# Patient Record
Sex: MALE | HISPANIC OR LATINO | URBAN - METROPOLITAN AREA
[De-identification: names, ages, dates, MRNs, and addresses within clinical notes are randomized per-mention and may not be internally consistent; named-entity substitution may affect disease eponyms.]

---

## 2018-10-18 NOTE — IMPRESSION/PLAN
Impression: Primary open-angle glaucoma, bilateral, indeterminate stage: H40.1134. /528
10/18 OCT 82/91 6/5 Plan: Discussed glaucoma and risk of vision loss without treatment and close follow-up. Discussed risks/benefits/alternatives to treatment. IOP too high for optic nerve appearance. OCT RNFL ordered and reviewed with pt. Patient has been using Alphagan P QD OU. Rec patient increase  Alphagan P BID OU will recheck IOP in 1 month DE, HVF and photos. May need additional gtts at next visit.

## 2018-10-18 NOTE — IMPRESSION/PLAN
Impression: Type 2 diab w mild nonprlf diabetic rtnop w/o macular edema, bilateral: E06.8087. Plan: OU: Discussed diagnosis in detail with patient. Advised patient of condition. Reassured patient of current condition and treatment. Emphasized blood sugar control. MAC OCT at next visit with DE. Will rec Retina consult if needed at next visit.

## 2018-12-27 NOTE — IMPRESSION/PLAN
Impression: Primary open-angle glaucoma, left eye, severe stage: O20.5049. Goal low teens /528 
10/18 OCT 82/91 6/5 12/18 HVF OD INS OS IA/SA Plan: Discussed glaucoma and risk of vision loss without treatment and close follow-up. Discussed risks/benefits/alternatives to treatment. HVF reviewed with patient.  Recommend patient continue Alphagan BID OU. 4 month IOP check with Dr Konstantin Hull

## 2018-12-28 NOTE — IMPRESSION/PLAN
Impression: Type 2 diab w moderate nonprlf diab rtnop w macular edema, bilateral: S07.8830.
12/18 MAC OCT ME OU Plan: Discussed diagnosis in detail with patient. Advised patient of condition. Emphasized blood sugar control. Consult recommended [[facility]], Dr Wil Mcneal, possible treatment.

## 2019-01-03 NOTE — IMPRESSION/PLAN
Impression: Type 2 diab w moderate nonprlf diab rtnop w macular edema, bilateral: M93.1036. Plan: OD: extra foveal thickening; observe for now. OS: mild IRF; Rec treatment with avastin but pt prefers to observe for now due to leaving town- will return in March. Will defer tx for now; discussed to anticipate tx next visit if unchanged or progression. Pt understood. Discussed importance of glycemic, bp, lipid control. 
F/u 2 mths DFE/OCT/poss avastin

## 2023-06-01 ENCOUNTER — OFFICE VISIT (OUTPATIENT)
Dept: URBAN - METROPOLITAN AREA CLINIC 45 | Facility: CLINIC | Age: 73
End: 2023-06-01
Payer: MEDICAID

## 2023-06-01 DIAGNOSIS — H40.1112 PRIMARY OPEN-ANGLE GLAUCOMA, RIGHT EYE, MODERATE STAGE: Primary | ICD-10-CM

## 2023-06-01 DIAGNOSIS — H40.1123 PRIMARY OPEN-ANGLE GLAUCOMA, LEFT EYE, SEVERE STAGE: ICD-10-CM

## 2023-06-01 DIAGNOSIS — E11.3313 TYPE 2 DIAB W MODERATE NONPRLF DIAB RTNOP W MACULAR EDEMA, BILATERAL: ICD-10-CM

## 2023-06-01 PROCEDURE — 99204 OFFICE O/P NEW MOD 45 MIN: CPT | Performed by: OPTOMETRIST

## 2023-06-01 PROCEDURE — 76514 ECHO EXAM OF EYE THICKNESS: CPT | Performed by: OPTOMETRIST

## 2023-06-01 PROCEDURE — 92134 CPTRZ OPH DX IMG PST SGM RTA: CPT | Performed by: OPTOMETRIST

## 2023-06-01 PROCEDURE — 92133 CPTRZD OPH DX IMG PST SGM ON: CPT | Performed by: OPTOMETRIST

## 2023-06-01 PROCEDURE — 92250 FUNDUS PHOTOGRAPHY W/I&R: CPT | Performed by: OPTOMETRIST

## 2023-06-01 RX ORDER — LATANOPROST 50 UG/ML
0.005 % SOLUTION OPHTHALMIC
Qty: 2.5 | Refills: 6 | Status: ACTIVE
Start: 2023-06-01

## 2023-06-01 RX ORDER — LATANOPROST 50 UG/ML
0.005 % SOLUTION OPHTHALMIC
Qty: 2.5 | Refills: 6 | Status: INACTIVE
Start: 2023-06-01 | End: 2023-06-01

## 2023-06-01 ASSESSMENT — INTRAOCULAR PRESSURE
OD: 15
OS: 15

## 2023-06-01 NOTE — IMPRESSION/PLAN
Impression: Primary open-angle glaucoma, left eye, severe stage: Q20.1556. Plan: Mely (Peruvian speaking tech) explained to patient in [place]uvian importance of using drops and risk of vision loss with poor compliance. Discussed drops vs surgery.  Start Latanoprost q hs ou, consult with Dr. Whitlock Nicely regarding surgical options

## 2023-06-01 NOTE — IMPRESSION/PLAN
Impression: Primary open-angle glaucoma, right eye, moderate stage: H40.1112. Plan: Pt has been off glc drops since 2018. Recommend patient start Latanoprost QHS OU. Due to poor compliance, discussed option of SLT/micropulse.  Will consult with Dr. Susy Alejandra

## 2023-06-01 NOTE — IMPRESSION/PLAN
Impression: Type 2 diab w moderate nonprlf diab rtnop w macular edema, bilateral: T80.9713. Plan: Diabetes type II: moderate background diabetic retinopathy, no signs of neovascularization noted. Will refer patient for a retinal consult to determine if treatment is necessary. Discussed ocular and systemic benefits of maintaining blood sugar control.

## 2023-08-01 ENCOUNTER — OFFICE VISIT (OUTPATIENT)
Dept: URBAN - METROPOLITAN AREA CLINIC 45 | Facility: CLINIC | Age: 73
End: 2023-08-01
Payer: COMMERCIAL

## 2023-08-01 DIAGNOSIS — E11.3313 TYPE 2 DIAB W MODERATE NONPRLF DIAB RTNOP W MACULAR EDEMA, BILATERAL: Primary | ICD-10-CM

## 2023-08-01 DIAGNOSIS — H40.1123 PRIMARY OPEN-ANGLE GLAUCOMA, LEFT EYE, SEVERE STAGE: ICD-10-CM

## 2023-08-01 DIAGNOSIS — H40.1133 PRIMARY OPEN-ANGLE GLAUCOMA, SEVERE STAGE, BILATERAL: ICD-10-CM

## 2023-08-01 PROCEDURE — 92020 GONIOSCOPY: CPT | Performed by: OPHTHALMOLOGY

## 2023-08-01 PROCEDURE — 99204 OFFICE O/P NEW MOD 45 MIN: CPT | Performed by: OPHTHALMOLOGY

## 2023-08-01 PROCEDURE — 92134 CPTRZ OPH DX IMG PST SGM RTA: CPT | Performed by: OPHTHALMOLOGY

## 2023-08-01 PROCEDURE — 92083 EXTENDED VISUAL FIELD XM: CPT | Performed by: OPHTHALMOLOGY

## 2023-08-01 RX ORDER — DORZOLAMIDE HYDROCHLORIDE AND TIMOLOL MALEATE 20; 5 MG/ML; MG/ML
SOLUTION/ DROPS OPHTHALMIC
Qty: 7.5 | Refills: 3 | Status: ACTIVE
Start: 2023-08-01

## 2023-08-01 RX ORDER — LATANOPROST 50 UG/ML
0.005 % SOLUTION OPHTHALMIC
Qty: 7.5 | Refills: 3 | Status: ACTIVE
Start: 2023-08-01

## 2023-08-01 ASSESSMENT — INTRAOCULAR PRESSURE
OD: 20
OS: 20

## 2023-08-17 ENCOUNTER — SURGERY (OUTPATIENT)
Dept: URBAN - METROPOLITAN AREA SURGERY 28 | Facility: LOCATION | Age: 73
End: 2023-08-17
Payer: COMMERCIAL

## 2023-08-17 ENCOUNTER — SURGERY (OUTPATIENT)
Dept: URBAN - METROPOLITAN AREA SURGERY 29 | Facility: LOCATION | Age: 73
End: 2023-08-17
Payer: COMMERCIAL

## 2023-08-17 PROCEDURE — 65855 TRABECULOPLASTY LASER SURG: CPT | Performed by: OPHTHALMOLOGY

## 2023-08-22 ENCOUNTER — POST-OPERATIVE VISIT (OUTPATIENT)
Dept: URBAN - METROPOLITAN AREA CLINIC 45 | Facility: CLINIC | Age: 73
End: 2023-08-22
Payer: COMMERCIAL

## 2023-08-22 DIAGNOSIS — Z48.810 ENCOUNTER FOR SURGICAL AFTERCARE FOLLOWING SURGERY ON A SENSE ORGAN: Primary | ICD-10-CM

## 2023-08-22 PROCEDURE — 99024 POSTOP FOLLOW-UP VISIT: CPT | Performed by: OPHTHALMOLOGY

## 2023-08-22 ASSESSMENT — INTRAOCULAR PRESSURE
OS: 16
OD: 15

## 2023-09-07 ENCOUNTER — SURGERY (OUTPATIENT)
Dept: URBAN - METROPOLITAN AREA SURGERY 29 | Facility: LOCATION | Age: 73
End: 2023-09-07
Payer: COMMERCIAL

## 2023-09-07 ENCOUNTER — SURGERY (OUTPATIENT)
Dept: URBAN - METROPOLITAN AREA SURGERY 28 | Facility: LOCATION | Age: 73
End: 2023-09-07
Payer: COMMERCIAL

## 2023-09-07 PROCEDURE — 65855 TRABECULOPLASTY LASER SURG: CPT | Performed by: OPHTHALMOLOGY

## 2023-09-07 RX ORDER — PREDNISOLONE ACETATE 10 MG/ML
1 % SUSPENSION/ DROPS OPHTHALMIC
Qty: 10 | Refills: 0 | Status: ACTIVE
Start: 2023-09-07

## 2023-09-13 ENCOUNTER — POST-OPERATIVE VISIT (OUTPATIENT)
Dept: URBAN - METROPOLITAN AREA CLINIC 45 | Facility: CLINIC | Age: 73
End: 2023-09-13
Payer: COMMERCIAL

## 2023-09-13 DIAGNOSIS — Z48.810 ENCOUNTER FOR SURGICAL AFTERCARE FOLLOWING SURGERY ON A SENSE ORGAN: Primary | ICD-10-CM

## 2023-09-13 PROCEDURE — 99024 POSTOP FOLLOW-UP VISIT: CPT | Performed by: OPHTHALMOLOGY

## 2023-09-13 ASSESSMENT — INTRAOCULAR PRESSURE
OD: 14
OS: 16

## 2023-09-18 ENCOUNTER — OFFICE VISIT (OUTPATIENT)
Dept: URBAN - METROPOLITAN AREA CLINIC 45 | Facility: CLINIC | Age: 73
End: 2023-09-18
Payer: COMMERCIAL

## 2023-09-18 DIAGNOSIS — E11.3211 TYPE 2 DIABETES MELLITUS W/ MILD NONPROLIFERATIVE DIABETIC RETINOPATHY W/ MACULAR EDEMA OF RIGHT EYE: ICD-10-CM

## 2023-09-18 DIAGNOSIS — H43.12 VITREOUS HEMORRHAGE, LEFT EYE: ICD-10-CM

## 2023-09-18 DIAGNOSIS — H43.813 VITREOUS DEGENERATION, BILATERAL: ICD-10-CM

## 2023-09-18 DIAGNOSIS — E11.3512 TYPE 2 DIABETES MELLITUS WITH PROLIFERATIVE DIABETIC RETINOPATHY WITH MACULAR EDEMA, LEFT EYE: Primary | ICD-10-CM

## 2023-09-18 PROCEDURE — 92134 CPTRZ OPH DX IMG PST SGM RTA: CPT | Performed by: OPHTHALMOLOGY

## 2023-09-18 PROCEDURE — 67028 INJECTION EYE DRUG: CPT | Performed by: OPHTHALMOLOGY

## 2023-09-18 PROCEDURE — 92014 COMPRE OPH EXAM EST PT 1/>: CPT | Performed by: OPHTHALMOLOGY

## 2023-09-18 ASSESSMENT — INTRAOCULAR PRESSURE
OS: 20
OD: 18